# Patient Record
Sex: FEMALE | Race: BLACK OR AFRICAN AMERICAN
[De-identification: names, ages, dates, MRNs, and addresses within clinical notes are randomized per-mention and may not be internally consistent; named-entity substitution may affect disease eponyms.]

---

## 2020-07-01 ENCOUNTER — HOSPITAL ENCOUNTER (EMERGENCY)
Dept: HOSPITAL 61 - ER | Age: 50
Discharge: LEFT BEFORE DISCHARGE | End: 2020-07-01
Payer: MEDICARE

## 2020-07-01 VITALS — HEIGHT: 69 IN | BODY MASS INDEX: 31.02 KG/M2 | WEIGHT: 209.44 LBS

## 2020-07-01 VITALS — SYSTOLIC BLOOD PRESSURE: 163 MMHG | DIASTOLIC BLOOD PRESSURE: 92 MMHG

## 2020-07-01 DIAGNOSIS — F12.90: ICD-10-CM

## 2020-07-01 DIAGNOSIS — Z88.5: ICD-10-CM

## 2020-07-01 DIAGNOSIS — F17.200: ICD-10-CM

## 2020-07-01 DIAGNOSIS — Z88.8: ICD-10-CM

## 2020-07-01 DIAGNOSIS — Z98.890: ICD-10-CM

## 2020-07-01 DIAGNOSIS — R45.1: ICD-10-CM

## 2020-07-01 DIAGNOSIS — R07.89: ICD-10-CM

## 2020-07-01 DIAGNOSIS — E11.649: Primary | ICD-10-CM

## 2020-07-01 DIAGNOSIS — Z91.018: ICD-10-CM

## 2020-07-01 PROCEDURE — 82962 GLUCOSE BLOOD TEST: CPT

## 2020-07-01 PROCEDURE — 93005 ELECTROCARDIOGRAM TRACING: CPT

## 2020-07-01 PROCEDURE — 99283 EMERGENCY DEPT VISIT LOW MDM: CPT

## 2020-07-07 NOTE — EKG
[facility]

              [address] [place] [address]

Test Date:    2020               Test Time:    17:26:18

Pat Name:     VINNIE SALGUERO           Department:   

Patient ID:   [id number]           Room:          

Gender:       F                        Technician:   SC

:          1970               Requested By: VINNIE SUTHERLAND

Order Number: [id number]           Reading MD:     

                                 Measurements

Intervals                              Axis          

Rate:         94                       P:            69

OK:           142                      QRS:          -87

QRSD:         88                       T:            52

QT:           384                                    

QTc:          486                                    

                           Interpretive Statements

SINUS RHYTHM

ABNORMAL LEFT AXIS DEVIATION

LOW LIMB LEAD VOLTAGE

LEFT ANTERIOR FASCICULAR BLOCK

QRS(T) CONTOUR ABNORMALITY

CONSISTENT WITH LATERAL INFARCT

PROBABLY OLD

CONSIDER INFERIOR MYOCARDIAL DAMAGE

ABNORMAL ECG

RI6.02

No previous ECG available for comparison

## 2024-01-02 NOTE — PHYS DOC
Past Medical History


Past Medical History:  Diabetes-Type II


Past Surgical History:  No Surgical History


Additional Past Surgical Histo:  CARDAIC STENTS PLACED


Smoking Status:  Current Every Day Smoker


Alcohol Use:  Occasionally


Drug Use:  Marijuana





General Adult


HPI:


HPI:





50-year-old female past medical history significant for CAD, insulin-dependent 

diabetes, anxiety and chronic pain, presents to the ED brought by EMS with 

concern for acute agitation and delirium, blood glucose was 36 in the field. Pt 

was agitated with no DMC in the field, EMS cannot place peripheral IV, I dir

ected EMS to give 1 mg glucagon IM.  They rechecked her glucose and it was 46.  

On arrival patient speaking, talking in full sentences.  Does report left-sided 

type chest pain nonradiating under her breastbone that started earlier this 

morning.  Reports she takes 27 units of Lantus every morning, 17 units of 

regular insulin every morning.  Checks her glucose at dinner, today was 134 and 

then doses her insulin accordingly.  Takes no oral diabetes medications.  Is 

very anxious in the ED and states " [place] killed my  and [facility] brought him back, I'm refusing care." Pt requesting  to be 

brought back to the ed, refusing IV placement/labs. Consents to ekg and apple 

juice.  Reports she has not taken her Norco or Xanax today (2mg tid).  Follows 

with cardiology at .





ROS: No associated fever, chills, cough, dyspnea, nausea, vomiting, sore throat,

unilateral leg swelling, rash, hemoptysis, joint deformities, neurologic 

deficits.





Heart Score:


Risk Factors:


Risk Factors:  DM, Current or recent (<one month) smoker, HTN, HLP, family 

history of CAD, obesity.


Risk Scores:


Score 0 - 3:  2.5% MACE over next 6 weeks - Discharge Home


Score 4 - 6:  20.3% MACE over next 6 weeks - Admit for Clinical Observation


Score 7 - 10:  72.7% MACE over next 6 weeks - Early Invasive Strategies





Allergies:


Allergies:





Allergies








Coded Allergies Type Severity Reaction Last Updated Verified


 


  codeine Allergy Intermediate  11/28/18 Yes


 


  diphenhydramine Allergy Intermediate  11/28/18 Yes


 


  strawberry Allergy Intermediate  11/28/18 Yes











Physical Exam:


PE:





Constitutional: Well developed, well nourished, no acute distress, non-toxic 

appearance. [] Accu-Chek in ED is 85


HENT: Normocephalic, atraumatic, bilateral external ears normal, oropharynx 

moist, no oral exudates, nose normal. [] No signs of head trauma


Eyes: PERRLA, EOMI, conjunctiva normal, no discharge. [] 


Neck: Normal range of motion, no tenderness, supple, no stridor. [] 


Cardiovascular:Heart rate regular rhythm, no murmur []


Lungs & Thorax:  Bilateral breath sounds clear to auscultation []


Abdomen: Bowel sounds normal, soft, no tenderness, no masses, no pulsatile 

masses. [] 


Skin: Warm, dry, no erythema, no rash. [] 


Back: No tenderness, no CVA tenderness. [] 


Extremities: No tenderness, no cyanosis, no clubbing, ROM intact, no edema. [] 

Very tremulous


Neurologic: Alert and oriented X 3, normal motor function, normal sensory 

function, no focal deficits noted. [] states " I am not on my right state of 

mind, give me 20 minutes" -provides full history


Psychologic: Affect normal, judgement normal, mood normal. [] Very anxious





EKG:


EKG:


Poor baseline EKG due to movement artifact, patient very tremulous and anxious 

in ED.  Sinus rhythm at 94 bpm, left axis deviation, , no obvious ST 

elevations ST depressions or T wave inversions





Radiology/Procedures:


Radiology/Procedures:


[]


Impression:


Patient presents to the ED with concern for hypoglycemia only on subcu insulin, 

no oral diabetes medications, denies any sulfonylureas.  Patient reports she has

 had chest pain all day but follows with [facility] cardiology-refuses any chest x-ray, 

IV, labs, did except EKG that did not show a STEMI.





The patient decided to leave her facility AGAINST MEDICAL ADVICE.  I have 

assessed the patient's ability to make informed decision and felt the patient 

has the capacity to comprehend information regarding the current medical 

condition and appreciates the impact of disease her condition and consequences 

of various options for treatment including foregoing treatment.  Patient is able

 to communicate that I was concerned she may be having life-threatening ACS 

versus an STEMI versus aortic dissection versus pulmonary emboli versus 

electrolyte abnormality versus lethal arrhythmia versus death.  Patient 

understands this and still wishes to be discharged against my medical advice, 

even if that should result in permanent disability or loss of current lifestyle.

  Patient would not wait for any discharge instructions, medications were 

advised.  This conversation was witnessed by nursing staff and we clearly 

communicated that the patient is welcome to return anytime to continue care at 

our facility.





Course & Med Decision Making:


Course & Med Decision Making


Pertinent Labs and Imaging studies reviewed. (See chart for details)





[]





Dragon Disclaimer:


Dragon Disclaimer:


This electronic medical record was generated, in whole or in part, using a voice

 recognition dictation system.





Departure


Departure


Impression:  


   Primary Impression:  


   Hypoglycemia


   Additional Impression:  


   Chest pain


Disposition:  09 ADMITTED AS INPATIENT


Condition:  GUARDED


Referrals:  


NO PCP (PCP)





Justicifation of Admission Dx:


Justifications for Admission:


Justification of Admission Dx:  N/A











VINNIE SUTHERLAND DO                Jul 1, 2020 17:36 Refill request    Medication: amitriptyline (ELAVIL) 25 MG tablet     Sig: Take 1 tablet (25 mg) by mouth at bedtime - Oral     Dispensed: 30  Refills: 2  SOLD to the pt on: 10/9/23     Last clinic appointment: 5/3/23  Next clinic appointment: not scheduled     Last Drug Screen Collected: not on file  Controlled Substance Agreement signed: not on file       Preferred pharmacy:    [facility] - [place] - [address]       Refill request routed to the provider to review.